# Patient Record
Sex: MALE | Race: OTHER
[De-identification: names, ages, dates, MRNs, and addresses within clinical notes are randomized per-mention and may not be internally consistent; named-entity substitution may affect disease eponyms.]

---

## 2022-01-19 ENCOUNTER — HOSPITAL ENCOUNTER (OUTPATIENT)
Dept: HOSPITAL 61 - RAD | Age: 42
End: 2022-01-19
Attending: PHYSICIAN ASSISTANT
Payer: COMMERCIAL

## 2022-01-19 DIAGNOSIS — U07.1: Primary | ICD-10-CM

## 2022-01-19 PROCEDURE — 71046 X-RAY EXAM CHEST 2 VIEWS: CPT

## 2022-01-19 NOTE — RAD
Chest, PA and Lateral:



Technique:  PA and lateral views of the chest were obtained.



History:  Shortness of breath.



Comparison: None.



Findings:

 Low lung volumes and technique accentuates heart size and pulmonary vascularity. Minimal prominent b
ilateral interstitial lung markings could be mild congestive changes or interstitial infiltrates.. . 
.



Impression:



Minimal prominent bilateral interstitial lung markings could be mild congestive changes or interstiti
al infiltrates.. 



Electronically signed by: Wolf Ziegler MD (1/19/2022 5:25 PM) [id number]